# Patient Record
Sex: FEMALE | URBAN - METROPOLITAN AREA
[De-identification: names, ages, dates, MRNs, and addresses within clinical notes are randomized per-mention and may not be internally consistent; named-entity substitution may affect disease eponyms.]

---

## 2023-05-10 ENCOUNTER — OFFICE VISIT (OUTPATIENT)
Dept: URBAN - METROPOLITAN AREA CLINIC 7 | Facility: CLINIC | Age: 63
End: 2023-05-10

## 2023-05-10 NOTE — HPI-TODAY'S VISIT:
Patient is new to the practice and is being evaluated for gastroesophageal reflux symptoms.  She is on PPI in the form of esomeprazole 40 mg daily.  Recent laboratory testing demonstrated an A1c of 6, hemoglobin 13.4, platelets 200, creatinine 0.6, sodium 143, normal LFTs.  Last colonoscopy may have been in January 2020.  She does have hypertension and hypothyroidism as well.

## 2023-05-10 NOTE — PHYSICAL EXAM GASTROINTESTINAL
Abdomen , soft, nontender, nondistended , no guarding or rigidity , no masses palpable , normal bowel sounds , Liver and Spleen , no hepatomegaly present , no hepatosplenomegaly , liver nontender , spleen not palpable [FreeTextEntry1] : dry skin [de-identified] : 25 year old female here with complaints of dry itching skin for 1-2 months. Was in [place] prior to 2 months ago and did not have any complaints.  No change in detergent or soaps.

## 2025-02-12 ENCOUNTER — OFFICE VISIT (OUTPATIENT)
Dept: URBAN - METROPOLITAN AREA SURGERY CENTER 4 | Facility: SURGERY CENTER | Age: 65
End: 2025-02-12

## 2025-03-31 ENCOUNTER — OFFICE VISIT (OUTPATIENT)
Dept: URBAN - METROPOLITAN AREA CLINIC 63 | Facility: CLINIC | Age: 65
End: 2025-03-31